# Patient Record
Sex: FEMALE | Race: WHITE | Employment: FULL TIME | URBAN - METROPOLITAN AREA
[De-identification: names, ages, dates, MRNs, and addresses within clinical notes are randomized per-mention and may not be internally consistent; named-entity substitution may affect disease eponyms.]

---
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
## 2019-01-07 ENCOUNTER — OFFICE VISIT (OUTPATIENT)
Dept: FAMILY MEDICINE | Facility: CLINIC | Age: 19
End: 2019-01-07
Payer: COMMERCIAL

## 2019-01-07 VITALS
HEART RATE: 111 BPM | HEIGHT: 69 IN | OXYGEN SATURATION: 97 % | BODY MASS INDEX: 21.92 KG/M2 | RESPIRATION RATE: 20 BRPM | DIASTOLIC BLOOD PRESSURE: 76 MMHG | WEIGHT: 148 LBS | TEMPERATURE: 99 F | SYSTOLIC BLOOD PRESSURE: 130 MMHG

## 2019-01-07 DIAGNOSIS — A63.0 GENITAL WARTS: Primary | ICD-10-CM

## 2019-01-07 PROCEDURE — 46924 DESTRUCTION ANAL LESION(S): CPT | Performed by: NURSE PRACTITIONER

## 2019-01-07 PROCEDURE — 99207 ZZC FOR CODING REVIEW: CPT | Performed by: NURSE PRACTITIONER

## 2019-01-07 ASSESSMENT — MIFFLIN-ST. JEOR: SCORE: 1510.7

## 2019-01-07 NOTE — PROGRESS NOTES
SUBJECTIVE:   Sharona Herrera is a 18 year old female who presents to clinic today for the following health issues:      Vaginal Symptoms      Duration: 5 weeks    Description  itching and red bumps/ rash/blisters on buttocks     Intensity:  mild, moderate    Accompanying signs and symptoms (fever/dysuria/abdominal or back pain): None    History  Sexually active: yes, single partner, contraception - Norplant  Possibility of pregnancy: No  Recent antibiotic use: no     Precipitating or alleviating factors: None    Therapies tried and outcome: none and stopped using tampons and started pads   Outcome: rash got worse    11/6/19: patient was seen in clinic for these symptoms by in clinic-   Referral was placed to GYN for concerns for genital warts.   Patient states that she only gets these bumps when she has her menstrual cycle- possibly due to feminine pads that she is wearing. Unable to wear tampons due to discomfort.  She has an Implanon in place. She is  and does not have concerns for STD's.    No previous history of STD's.       -------------------------------------    Problem list and histories reviewed & adjusted, as indicated.  Additional history: as documented    Patient Active Problem List   Diagnosis     Family history of blood disorder     Mood swings     Seasonal allergic rhinitis     Nexplanon insertion     Past Surgical History:   Procedure Laterality Date     LAPAROSCOPIC APPENDECTOMY CHILD  11/25/2012    Procedure: LAPAROSCOPIC APPENDECTOMY CHILD;;  Surgeon: Pop Anne MD;  Location: [facility]     LAPAROSCOPY DIAGNOSTIC (GENERAL)  11/26/2012    Procedure: LAPAROSCOPY DIAGNOSTIC (GENERAL);  Exploratory Laparoscopy with drainage of abcess;  Surgeon: Pop Anne MD;  Location: [facility] OR     PE TUBES       TONSILLECTOMY & ADENOIDECTOMY       TYMPANOPLASTY CHILD  11/6/2013    Procedure: TYMPANOPLASTY CHILD;  Right Ear Tympanoplasty;  Surgeon: Preston Prince MD;  Location: [facility] OR      "  Social History     Tobacco Use     Smoking status: Passive Smoke Exposure - Never Smoker     Smokeless tobacco: Never Used     Tobacco comment: exposure at mother and fathers house, smoke outside   Substance Use Topics     Alcohol use: No     Alcohol/week: 0.0 oz     Family History   Problem Relation Age of Onset     Depression Mother      Cerebrovascular Disease Mother      Genetic Disorder Mother          Protein S     Heart Disease Maternal Grandfather      Bipolar Disorder Maternal Grandmother      Heart Disease Paternal Grandmother      Cancer Paternal Grandfather         leukemia           Reviewed and updated as needed this visit by clinical staff       Reviewed and updated as needed this visit by Provider         ROS:  Constitutional, HEENT, cardiovascular, pulmonary, GI, , musculoskeletal, neuro, skin, endocrine and psych systems are negative, except as otherwise noted.    OBJECTIVE:     /76 (BP Location: Left arm, Patient Position: Chair)   Pulse 111   Temp 99  F (37.2  C) (Tympanic)   Resp 20   Ht 1.753 m (5' 9\")   Wt 67.1 kg (148 lb)   SpO2 97%   BMI 21.86 kg/m    Body mass index is 21.86 kg/m .  GENERAL APPEARANCE: healthy, alert and no distress   (female): scattered flesh color papules on rectal area    Diagnostic Test Results:  none     ASSESSMENT/PLAN:         1. Genital warts  >  15 warts were treated with Podocon benzoin tincture.   Patient tolerated procedure well  Encouraged to follow up with GYN prn     FABIO Alonso CNP  [facility]  "

## 2019-01-07 NOTE — NURSING NOTE
"Initial /76 (BP Location: Left arm, Patient Position: Chair)   Pulse 111   Temp 99  F (37.2  C) (Tympanic)   Resp 20   Ht 1.753 m (5' 9\")   Wt 67.1 kg (148 lb)   SpO2 97%   BMI 21.86 kg/m   Estimated body mass index is 21.86 kg/m  as calculated from the following:    Height as of this encounter: 1.753 m (5' 9\").    Weight as of this encounter: 67.1 kg (148 lb). .    Anita Lainez    "

## 2019-01-23 PROBLEM — F32.A DEPRESSION, UNSPECIFIED DEPRESSION TYPE: Status: ACTIVE | Noted: 2019-01-23

## 2019-02-14 ENCOUNTER — OFFICE VISIT (OUTPATIENT)
Dept: FAMILY MEDICINE | Facility: CLINIC | Age: 19
End: 2019-02-14
Payer: COMMERCIAL

## 2019-02-14 VITALS
HEIGHT: 69 IN | OXYGEN SATURATION: 97 % | BODY MASS INDEX: 21.17 KG/M2 | RESPIRATION RATE: 18 BRPM | DIASTOLIC BLOOD PRESSURE: 70 MMHG | WEIGHT: 142.9 LBS | TEMPERATURE: 98.3 F | HEART RATE: 95 BPM | SYSTOLIC BLOOD PRESSURE: 110 MMHG

## 2019-02-14 DIAGNOSIS — Z11.1 SCREENING EXAMINATION FOR PULMONARY TUBERCULOSIS: ICD-10-CM

## 2019-02-14 DIAGNOSIS — G43.009 MIGRAINE WITHOUT AURA AND WITHOUT STATUS MIGRAINOSUS, NOT INTRACTABLE: Primary | ICD-10-CM

## 2019-02-14 PROCEDURE — 99214 OFFICE O/P EST MOD 30 MIN: CPT | Performed by: NURSE PRACTITIONER

## 2019-02-14 PROCEDURE — 36415 COLL VENOUS BLD VENIPUNCTURE: CPT | Performed by: NURSE PRACTITIONER

## 2019-02-14 PROCEDURE — 86480 TB TEST CELL IMMUN MEASURE: CPT | Performed by: NURSE PRACTITIONER

## 2019-02-14 RX ORDER — SUMATRIPTAN 25 MG/1
25 TABLET, FILM COATED ORAL
Qty: 8 TABLET | Refills: 1 | Status: SHIPPED | OUTPATIENT
Start: 2019-02-14

## 2019-02-14 RX ORDER — GABAPENTIN 100 MG/1
100 CAPSULE ORAL 2 TIMES DAILY
Qty: 60 CAPSULE | Refills: 2 | Status: SHIPPED | OUTPATIENT
Start: 2019-02-14 | End: 2020-02-26

## 2019-02-14 ASSESSMENT — MIFFLIN-ST. JEOR: SCORE: 1487.57

## 2019-02-14 NOTE — PROGRESS NOTES
SUBJECTIVE:   Sharona Herrera is a 18 year old female who presents to clinic today for the following health issues:    Chief Complaint   Patient presents with     Blood Draw     TB Gold      Headache       Headache  Onset: Long time- Years     Description:   Location: starts in the front of her head then goes all over    Character: throbbing pain  Frequency:  Once a week   Duration:  Sometimes a day, can last up to 3 days     Intensity: 2/10 worse: 10/10     Progression of Symptoms:  Worsening over the past year     Accompanying Signs & Symptoms:  Stiff neck: no   Neck or upper back pain: YES- back pain- sees a chiropractor   Fever: no   Sinus pressure: no   Nausea or vomiting: YES, nausea   Dizziness: YES when she tries to stand up when she has a headache   Numbness: YES- legs tend to go numb   Weakness: YES  Visual changes: no     History:   Head trauma: no   Family history of migraines: YES- Mother   Previous tests for headaches: no   Neurologist evaluations: no   Able to do daily activities: YES- some days, depends on the severity of them   Wake with a headaches: YES  Do headaches wake you up: no   Daily pain medication use: no   Work/school stressors/changes: no     Precipitating factors:   Does light make it worse: YES  Does sound make it worse: YES    Alleviating factors:  Does sleep help: YES-sometimes     Therapies Tried and outcome: ibuprofen 800mg- does help, habiscus tea, essential oils, cold compresses on the back of her neck     Problem list and histories reviewed & adjusted, as indicated.  Additional history: as documented    Labs reviewed in EPIC    Reviewed and updated as needed this visit by clinical staff  Tobacco  Allergies  Meds  Med Hx  Surg Hx  Fam Hx  Soc Hx      Reviewed and updated as needed this visit by Provider         ROS:  Constitutional, HEENT, cardiovascular, pulmonary, gi and gu systems are negative, except as otherwise noted.    OBJECTIVE:     /70 (BP  "Location: Left arm, Patient Position: Sitting, Cuff Size: Adult Regular)   Pulse 95   Temp 98.3  F (36.8  C) (Tympanic)   Resp 18   Ht 1.753 m (5' 9\")   Wt 64.8 kg (142 lb 14.4 oz)   SpO2 97%   BMI 21.10 kg/m    Body mass index is 21.1 kg/m .  GENERAL: healthy, alert and no distress  EYES: Eyes grossly normal to inspection, PERRL and conjunctivae and sclerae normal  NEURO: Normal strength and tone, mentation intact and speech normal  PSYCH: mentation appears normal, affect normal/bright    Diagnostic Test Results:  TB test pending     ASSESSMENT/PLAN:     1. Migraine without aura and without status migrainosus, not intractable  - gabapentin (NEURONTIN) 100 MG capsule; Take 1 capsule (100 mg) by mouth 2 times daily  Dispense: 60 capsule; Refill: 2  - SUMAtriptan (IMITREX) 25 MG tablet; Take 1 tablet (25 mg) by mouth at onset of headache for migraine  Dispense: 8 tablet; Refill: 1  -follow up in 1 month  -avoid daily use of NSAID's to prevent rebound headaches     2. Screening examination for pulmonary tuberculosis    - Quantiferon TB Gold Plus    See Patient Instructions    FABIO Bravo [facility]  "

## 2019-02-14 NOTE — PATIENT INSTRUCTIONS
TB test    Start Gabapentin 100 mg twice daily   Try Imitrex 25-50 mg at the onset of a headache, can repeat treatment in 2 hours if still having headache    Follow up in 1 month to recheck    Can also try Naproxen 220 mg twice daily as needed for headache    Avoid daily use of NSAID's (Ibuproen, Naproxen) as it might cause rebound headaches

## 2019-02-15 ASSESSMENT — ANXIETY QUESTIONNAIRES
GAD7 TOTAL SCORE: 15
4. TROUBLE RELAXING: MORE THAN HALF THE DAYS
2. NOT BEING ABLE TO STOP OR CONTROL WORRYING: NEARLY EVERY DAY
5. BEING SO RESTLESS THAT IT IS HARD TO SIT STILL: MORE THAN HALF THE DAYS
1. FEELING NERVOUS, ANXIOUS, OR ON EDGE: MORE THAN HALF THE DAYS
6. BECOMING EASILY ANNOYED OR IRRITABLE: MORE THAN HALF THE DAYS
IF YOU CHECKED OFF ANY PROBLEMS ON THIS QUESTIONNAIRE, HOW DIFFICULT HAVE THESE PROBLEMS MADE IT FOR YOU TO DO YOUR WORK, TAKE CARE OF THINGS AT HOME, OR GET ALONG WITH OTHER PEOPLE: SOMEWHAT DIFFICULT
7. FEELING AFRAID AS IF SOMETHING AWFUL MIGHT HAPPEN: SEVERAL DAYS
3. WORRYING TOO MUCH ABOUT DIFFERENT THINGS: NEARLY EVERY DAY

## 2019-02-15 ASSESSMENT — PATIENT HEALTH QUESTIONNAIRE - PHQ9: SUM OF ALL RESPONSES TO PHQ QUESTIONS 1-9: 15

## 2019-02-16 ASSESSMENT — ANXIETY QUESTIONNAIRES: GAD7 TOTAL SCORE: 15

## 2019-02-18 PROBLEM — G43.009 MIGRAINE WITHOUT AURA AND WITHOUT STATUS MIGRAINOSUS, NOT INTRACTABLE: Status: ACTIVE | Noted: 2019-02-18

## 2019-02-18 LAB
GAMMA INTERFERON BACKGROUND BLD IA-ACNC: 0.06 IU/ML
M TB IFN-G BLD-IMP: NEGATIVE
M TB IFN-G CD4+ BCKGRND COR BLD-ACNC: >10 IU/ML
MITOGEN IGNF BCKGRD COR BLD-ACNC: 0 IU/ML
MITOGEN IGNF BCKGRD COR BLD-ACNC: 0 IU/ML

## 2019-08-15 ENCOUNTER — TELEPHONE (OUTPATIENT)
Dept: FAMILY MEDICINE | Facility: CLINIC | Age: 19
End: 2019-08-15

## 2019-08-15 ASSESSMENT — PATIENT HEALTH QUESTIONNAIRE - PHQ9
SUM OF ALL RESPONSES TO PHQ QUESTIONS 1-9: 12
5. POOR APPETITE OR OVEREATING: MORE THAN HALF THE DAYS

## 2019-08-15 ASSESSMENT — ANXIETY QUESTIONNAIRES
3. WORRYING TOO MUCH ABOUT DIFFERENT THINGS: NEARLY EVERY DAY
5. BEING SO RESTLESS THAT IT IS HARD TO SIT STILL: MORE THAN HALF THE DAYS
7. FEELING AFRAID AS IF SOMETHING AWFUL MIGHT HAPPEN: MORE THAN HALF THE DAYS
6. BECOMING EASILY ANNOYED OR IRRITABLE: MORE THAN HALF THE DAYS
1. FEELING NERVOUS, ANXIOUS, OR ON EDGE: NEARLY EVERY DAY
GAD7 TOTAL SCORE: 17
2. NOT BEING ABLE TO STOP OR CONTROL WORRYING: NEARLY EVERY DAY

## 2019-08-15 NOTE — TELEPHONE ENCOUNTER
Panel Management Review      Patient has the following on her problem list:     Depression / Dysthymia review    Measure:  Needs PHQ-9 score of 4 or less during index window.  Administer PHQ-9 and if score is 5 or more, send encounter to provider for next steps.    4 - 8 month window range: 6/16/19-10/14/19    PHQ-9 SCORE 12/21/2015 2/15/2019   PHQ-9 Total Score 16 15       If PHQ-9 recheck is 5 or more, route to provider for next steps.    Patient is due for:  PHQ9      Composite cancer screening  Chart review shows that this patient is due/due soon for the following None  Summary:    Patient is due/failing the following:   PHQ9    Action needed:   Patient needs to do PHQ9.    Type of outreach:    Phone, spoke with the patient and questionnaires completed.  Gets more irritable easily and upset faster.  Not currently on any medications - stopped the gabapentin due to side effects - was seeing flashbacks from PTSD, has been trying some behavioral changes which is helping, essential oils.  Is not seeing psychology or psychiatry currently - no health insurance.  Advised the patient I would forward this information to her provider and we will follow up with further recommendations.     PHQ-9 ([employer]) 8/15/2019   1.  Little interest or pleasure in doing things 0   2.  Feeling down, depressed, or hopeless 1   3.  Trouble falling or staying asleep, or sleeping too much 3   4.  Feeling tired or having little energy 3   5.  Poor appetite or overeating 2   6.  Feeling bad about yourself 1   7.  Trouble concentrating 1   8.  Moving slowly or restless 1   9.  Suicidal or self-harm thoughts 0   PHQ-9 Total Score 12   Difficulty at work, home, or with people    ABRAHAN-7   [employer], 2002; Used with Permission) 8/15/2019   1. Feeling nervous, anxious, or on edge 3   2. Not being able to stop or control worrying 3   3. Worrying too much about different things 3   4. Trouble relaxing 2   5. Being so restless that it is hard to sit  still 2   6. Becoming easily annoyed or irritable 2   7. Feeling afraid, as if something awful might happen 2   ABRAHAN-7 Total Score 17   If you checked any problems, how difficult have they made it for you to do your work, take care of things at home, or get along with other people?        Questions for provider review:    Any recommendations?                                                                                                                                      GAYE Laureano MA       Chart routed to Provider.

## 2019-08-15 NOTE — LETTER
August 28, 2019      Sharona Herrera  [address]  [place] [address]        Dear Pau Tabor NPhas been reviewing your chart.  It appears that there are aspects of your care that could be improved.  At this time you are due for : A office visit to follow up on Depression and Anxiety .  If you could plan to do that in the near future it would be very helpful.  We are trying to help our patients achieve their health care goals.  Appointments can be scheduled by calling [phone].     If you have any questions please feel free to contact the clinic     Thank you,      Sincerely,        FABIO Austin CNP

## 2019-08-16 ASSESSMENT — ANXIETY QUESTIONNAIRES: GAD7 TOTAL SCORE: 17

## 2019-08-27 NOTE — TELEPHONE ENCOUNTER
(2nd attempt) Left a voice msg for pt to call back.  Whenever she does schedule depression follow up with PCP.  Malissa Baumann CMA ([facility])   (aka: Karena Baumann)

## 2019-08-28 NOTE — TELEPHONE ENCOUNTER
Panel Management Review      Patient has the following on her problem list:     Depression / Dysthymia review    Measure:  Needs PHQ-9 score of 4 or less during index window.  Administer PHQ-9 and if score is 5 or more, send encounter to provider for next steps.        PHQ-9 SCORE 12/21/2015 2/15/2019 8/15/2019   PHQ-9 Total Score 16 15 12       If PHQ-9 recheck is 5 or more, route to provider for next steps.    Composite cancer screening  Chart review shows that this patient is due/due soon for the following None  Summary:    Patient is due/failing the following:   FOLLOW UP    Action needed:   Patient needs office visit for Depression.    Type of outreach:    Sent letter.      Prince MERRILL CMA

## 2019-11-03 ENCOUNTER — HEALTH MAINTENANCE LETTER (OUTPATIENT)
Age: 19
End: 2019-11-03

## 2020-02-26 ENCOUNTER — OFFICE VISIT (OUTPATIENT)
Dept: FAMILY MEDICINE | Facility: CLINIC | Age: 20
End: 2020-02-26
Payer: COMMERCIAL

## 2020-02-26 VITALS
SYSTOLIC BLOOD PRESSURE: 118 MMHG | DIASTOLIC BLOOD PRESSURE: 62 MMHG | WEIGHT: 145.2 LBS | RESPIRATION RATE: 16 BRPM | BODY MASS INDEX: 22.01 KG/M2 | HEART RATE: 117 BPM | HEIGHT: 68 IN | TEMPERATURE: 98.2 F | OXYGEN SATURATION: 99 %

## 2020-02-26 DIAGNOSIS — Z30.46 NEXPLANON REMOVAL: Primary | ICD-10-CM

## 2020-02-26 PROCEDURE — 99207 ZZC DROP WITH A PROCEDURE: CPT | Performed by: FAMILY MEDICINE

## 2020-02-26 PROCEDURE — 11982 REMOVE DRUG IMPLANT DEVICE: CPT | Performed by: FAMILY MEDICINE

## 2020-02-26 ASSESSMENT — MIFFLIN-ST. JEOR: SCORE: 1471.06

## 2020-02-26 NOTE — PROGRESS NOTES
"Subjective     Sharona Herrera is a 19 year old female who presents to clinic today for the following health issues:    HPI   Chief Complaint   Patient presents with     Contraception     Nexplanon removal. Patient wanting to have babies.   Has been having frequent periods with Nexplanon and ready to start trying for pregnancy.    Review of Systems         Objective    /62   Pulse 117   Temp 98.2  F (36.8  C) (Tympanic)   Resp 16   Ht 1.718 m (5' 7.62\")   Wt 65.9 kg (145 lb 3.2 oz)   LMP  (Exact Date)   SpO2 99%   BMI 22.33 kg/m    Body mass index is 22.33 kg/m .  Physical Exam       Diagnostic Test Results:  Labs reviewed in Epic            Nexplanon Removal:     Is a pregnancy test required: No.  Was a consent obtained?  Yes    Sharona Hererra is here for removal of etonogestrel implant Nexplanon/Implanon    Indication: desiring pregnancy      Preoperative Diagnosis: etonogestrel implant  Postoperative Diagnosis: etonogestrel implant removed    Technique: On the left arm  Skin prep Betadine  Anesthesia 1% lidocaine, with epi  Procedure: Small incision (<5mm) was made at distal end of palpable implant, curved hemostat or mosquito forceps was used to isolate the implant and bring it to the incision, the fibrous capsule containing the implant  was incised and the Implant was removed intact.    EBL: minimal  Complications:  No  Tolerance:  Pt tolerated procedure well and was in stable condition.   Dressing:    A pressure bandage was placed for the next 12-24 hours.    Contraception was discussed and patient chose the following method none      Follow up: Pt was instructed to call if bleeding, severe pain or foul smell.     Rick Craft MD  "

## 2020-02-26 NOTE — PATIENT INSTRUCTIONS
Do start the prenatal vitamin    NEXPLANON removal AFTERCARE INSTRUCTIONS     Keep dressing on and dry for 24 hours, then remove wrap.  Replace bandaid daily for 5 days.      You may have some pain at the site of the Nexplanon removal. You can help relieve the discomfort with Tylenol (acetaminophen), Aspirin or Advil (ibuprofen). If your discomfort worsens or you notice redness spreading on the skin around the removal site, please call the clinic.     Warning Signs   Call the clinic if any of the following occurs:     You have bleeding, pus, or increasing redness, or pain at insertion site.     You have fever or chills     The implant comes out or you have concerns about its location.     You have a positive pregnancy test or suspect you might be pregnant.

## 2020-02-28 ASSESSMENT — ANXIETY QUESTIONNAIRES
GAD7 TOTAL SCORE: 20
6. BECOMING EASILY ANNOYED OR IRRITABLE: NEARLY EVERY DAY
7. FEELING AFRAID AS IF SOMETHING AWFUL MIGHT HAPPEN: NEARLY EVERY DAY
IF YOU CHECKED OFF ANY PROBLEMS ON THIS QUESTIONNAIRE, HOW DIFFICULT HAVE THESE PROBLEMS MADE IT FOR YOU TO DO YOUR WORK, TAKE CARE OF THINGS AT HOME, OR GET ALONG WITH OTHER PEOPLE: VERY DIFFICULT
2. NOT BEING ABLE TO STOP OR CONTROL WORRYING: NEARLY EVERY DAY
3. WORRYING TOO MUCH ABOUT DIFFERENT THINGS: NEARLY EVERY DAY
1. FEELING NERVOUS, ANXIOUS, OR ON EDGE: MORE THAN HALF THE DAYS
5. BEING SO RESTLESS THAT IT IS HARD TO SIT STILL: NEARLY EVERY DAY

## 2020-02-28 ASSESSMENT — PATIENT HEALTH QUESTIONNAIRE - PHQ9
SUM OF ALL RESPONSES TO PHQ QUESTIONS 1-9: 17
5. POOR APPETITE OR OVEREATING: NEARLY EVERY DAY

## 2020-02-29 ASSESSMENT — ANXIETY QUESTIONNAIRES: GAD7 TOTAL SCORE: 20

## 2020-05-27 ENCOUNTER — TELEPHONE (OUTPATIENT)
Dept: FAMILY MEDICINE | Facility: CLINIC | Age: 20
End: 2020-05-27

## 2020-05-27 DIAGNOSIS — Z11.1 SCREENING EXAMINATION FOR PULMONARY TUBERCULOSIS: Primary | ICD-10-CM

## 2020-05-27 NOTE — TELEPHONE ENCOUNTER
Reason for Call:  TB serology test      Detailed comments: patient is calling and stating that she needs a TB test for her schooling. She states she needs the serology test, as the other she gets very woozy and queezy from. Please call, after ordered.    Phone Number Patient can be reached at: Home number on file [phone] (home)    Best Time: any    Can we leave a detailed message on this number? YES   Ivanna Gaytan  Clinic Station        Call taken on 5/27/2020 at 12:32 PM by Ivanna Monterroso

## 2020-06-01 DIAGNOSIS — Z11.1 SCREENING EXAMINATION FOR PULMONARY TUBERCULOSIS: ICD-10-CM

## 2020-06-01 PROCEDURE — 36415 COLL VENOUS BLD VENIPUNCTURE: CPT | Performed by: NURSE PRACTITIONER

## 2020-06-01 PROCEDURE — 86481 TB AG RESPONSE T-CELL SUSP: CPT | Performed by: NURSE PRACTITIONER

## 2020-06-03 LAB
GAMMA INTERFERON BACKGROUND BLD IA-ACNC: 0.05 IU/ML
M TB IFN-G BLD-IMP: NEGATIVE
M TB IFN-G CD4+ BCKGRND COR BLD-ACNC: 8.58 IU/ML
MITOGEN IGNF BCKGRD COR BLD-ACNC: 0 IU/ML
MITOGEN IGNF BCKGRD COR BLD-ACNC: 0 IU/ML

## 2020-11-16 ENCOUNTER — HEALTH MAINTENANCE LETTER (OUTPATIENT)
Age: 20
End: 2020-11-16

## 2021-02-07 ENCOUNTER — HEALTH MAINTENANCE LETTER (OUTPATIENT)
Age: 21
End: 2021-02-07

## 2021-08-05 ENCOUNTER — MEDICAL CORRESPONDENCE (OUTPATIENT)
Dept: HEALTH INFORMATION MANAGEMENT | Facility: CLINIC | Age: 21
End: 2021-08-05

## 2021-08-23 ENCOUNTER — LAB (OUTPATIENT)
Dept: LAB | Facility: CLINIC | Age: 21
End: 2021-08-23
Payer: COMMERCIAL

## 2021-08-23 ENCOUNTER — TELEPHONE (OUTPATIENT)
Dept: FAMILY MEDICINE | Facility: CLINIC | Age: 21
End: 2021-08-23

## 2021-08-23 DIAGNOSIS — Z13.21 ENCOUNTER FOR VITAMIN DEFICIENCY SCREENING: ICD-10-CM

## 2021-08-23 DIAGNOSIS — Z79.899 HIGH RISK MEDICATION USE: Primary | ICD-10-CM

## 2021-08-23 LAB
ALBUMIN SERPL-MCNC: 4.2 G/DL (ref 3.4–5)
ALP SERPL-CCNC: 66 U/L (ref 40–150)
ALT SERPL W P-5'-P-CCNC: 17 U/L (ref 0–50)
AST SERPL W P-5'-P-CCNC: 11 U/L (ref 0–45)
BASOPHILS # BLD AUTO: 0 10E3/UL (ref 0–0.2)
BASOPHILS NFR BLD AUTO: 0 %
BILIRUB DIRECT SERPL-MCNC: 0.2 MG/DL (ref 0–0.2)
BILIRUB SERPL-MCNC: 0.7 MG/DL (ref 0.2–1.3)
EOSINOPHIL # BLD AUTO: 0.1 10E3/UL (ref 0–0.7)
EOSINOPHIL NFR BLD AUTO: 1 %
ERYTHROCYTE [DISTWIDTH] IN BLOOD BY AUTOMATED COUNT: 12.1 % (ref 10–15)
HCT VFR BLD AUTO: 42.9 % (ref 35–47)
HGB BLD-MCNC: 14.7 G/DL (ref 11.7–15.7)
LYMPHOCYTES # BLD AUTO: 2.1 10E3/UL (ref 0.8–5.3)
LYMPHOCYTES NFR BLD AUTO: 33 %
MCH RBC QN AUTO: 31.5 PG (ref 26.5–33)
MCHC RBC AUTO-ENTMCNC: 34.3 G/DL (ref 31.5–36.5)
MCV RBC AUTO: 92 FL (ref 78–100)
MONOCYTES # BLD AUTO: 0.5 10E3/UL (ref 0–1.3)
MONOCYTES NFR BLD AUTO: 8 %
NEUTROPHILS # BLD AUTO: 3.7 10E3/UL (ref 1.6–8.3)
NEUTROPHILS NFR BLD AUTO: 59 %
PLATELET # BLD AUTO: 193 10E3/UL (ref 150–450)
PROT SERPL-MCNC: 8 G/DL (ref 6.8–8.8)
PTH-INTACT SERPL-MCNC: 59 PG/ML (ref 18–80)
RBC # BLD AUTO: 4.67 10E6/UL (ref 3.8–5.2)
TSH SERPL DL<=0.005 MIU/L-ACNC: 1.13 MU/L (ref 0.4–4)
WBC # BLD AUTO: 6.4 10E3/UL (ref 4–11)

## 2021-08-23 PROCEDURE — 84443 ASSAY THYROID STIM HORMONE: CPT

## 2021-08-23 PROCEDURE — 82306 VITAMIN D 25 HYDROXY: CPT

## 2021-08-23 PROCEDURE — 36415 COLL VENOUS BLD VENIPUNCTURE: CPT

## 2021-08-23 PROCEDURE — 85025 COMPLETE CBC W/AUTO DIFF WBC: CPT

## 2021-08-23 PROCEDURE — 83970 ASSAY OF PARATHORMONE: CPT

## 2021-08-23 PROCEDURE — 80076 HEPATIC FUNCTION PANEL: CPT

## 2021-08-23 NOTE — TELEPHONE ENCOUNTER
Patient dropped off orders from [facility] for an EKG    Ordered by Esperanza Landis NP  Diagnosis High risk medication use  V58.69/  Z79.899    Patient does not have a future appointment set up for this.    Fax results to [phone]    Order sent to [facility] for scanning/

## 2021-08-24 LAB — DEPRECATED CALCIDIOL+CALCIFEROL SERPL-MC: 32 UG/L (ref 20–75)

## 2021-09-18 ENCOUNTER — HEALTH MAINTENANCE LETTER (OUTPATIENT)
Age: 21
End: 2021-09-18

## 2022-01-08 ENCOUNTER — HEALTH MAINTENANCE LETTER (OUTPATIENT)
Age: 22
End: 2022-01-08

## 2022-02-16 ENCOUNTER — HOSPITAL ENCOUNTER (EMERGENCY)
Facility: CLINIC | Age: 22
Discharge: HOME | End: 2022-02-17
Attending: EMERGENCY MEDICINE | Admitting: EMERGENCY MEDICINE
Payer: COMMERCIAL

## 2022-02-16 DIAGNOSIS — F10.929 ALCOHOLIC INTOXICATION WITH COMPLICATION (H): ICD-10-CM

## 2022-02-16 DIAGNOSIS — R45.851 SUICIDAL IDEATION: ICD-10-CM

## 2022-02-16 LAB
APAP SERPL-MCNC: <2 MG/L (ref 10–30)
ETHANOL SERPL-MCNC: 0.18 G/DL
HCG SERPL QL: NEGATIVE
SALICYLATES SERPL-MCNC: <2 MG/DL

## 2022-02-16 PROCEDURE — 80179 DRUG ASSAY SALICYLATE: CPT | Performed by: EMERGENCY MEDICINE

## 2022-02-16 PROCEDURE — 93005 ELECTROCARDIOGRAM TRACING: CPT

## 2022-02-16 PROCEDURE — 90791 PSYCH DIAGNOSTIC EVALUATION: CPT

## 2022-02-16 PROCEDURE — 82077 ASSAY SPEC XCP UR&BREATH IA: CPT | Performed by: EMERGENCY MEDICINE

## 2022-02-16 PROCEDURE — 80143 DRUG ASSAY ACETAMINOPHEN: CPT | Performed by: EMERGENCY MEDICINE

## 2022-02-16 PROCEDURE — 99285 EMERGENCY DEPT VISIT HI MDM: CPT | Mod: 25

## 2022-02-16 PROCEDURE — 96365 THER/PROPH/DIAG IV INF INIT: CPT

## 2022-02-16 PROCEDURE — 84703 CHORIONIC GONADOTROPIN ASSAY: CPT | Performed by: EMERGENCY MEDICINE

## 2022-02-16 PROCEDURE — 96368 THER/DIAG CONCURRENT INF: CPT

## 2022-02-16 PROCEDURE — 36415 COLL VENOUS BLD VENIPUNCTURE: CPT | Performed by: EMERGENCY MEDICINE

## 2022-02-16 PROCEDURE — 80048 BASIC METABOLIC PNL TOTAL CA: CPT | Performed by: EMERGENCY MEDICINE

## 2022-02-17 VITALS
SYSTOLIC BLOOD PRESSURE: 114 MMHG | TEMPERATURE: 98.7 F | RESPIRATION RATE: 16 BRPM | OXYGEN SATURATION: 99 % | DIASTOLIC BLOOD PRESSURE: 80 MMHG | HEART RATE: 85 BPM

## 2022-02-17 LAB
ANION GAP SERPL CALCULATED.3IONS-SCNC: 10 MMOL/L (ref 3–14)
ATRIAL RATE - MUSE: 104 BPM
BUN SERPL-MCNC: 8 MG/DL (ref 7–30)
CALCIUM SERPL-MCNC: 8.9 MG/DL (ref 8.5–10.1)
CHLORIDE BLD-SCNC: 107 MMOL/L (ref 94–109)
CO2 SERPL-SCNC: 20 MMOL/L (ref 20–32)
CREAT SERPL-MCNC: 0.67 MG/DL (ref 0.52–1.04)
DIASTOLIC BLOOD PRESSURE - MUSE: NORMAL MMHG
GFR SERPL CREATININE-BSD FRML MDRD: >90 ML/MIN/1.73M2
GLUCOSE BLD-MCNC: 100 MG/DL (ref 70–99)
HOLD SPECIMEN: NORMAL
INTERPRETATION ECG - MUSE: NORMAL
P AXIS - MUSE: 62 DEGREES
POTASSIUM BLD-SCNC: 2.6 MMOL/L (ref 3.4–5.3)
POTASSIUM BLD-SCNC: 4.3 MMOL/L (ref 3.4–5.3)
PR INTERVAL - MUSE: 150 MS
QRS DURATION - MUSE: 88 MS
QT - MUSE: 382 MS
QTC - MUSE: 502 MS
R AXIS - MUSE: 88 DEGREES
SODIUM SERPL-SCNC: 137 MMOL/L (ref 133–144)
SYSTOLIC BLOOD PRESSURE - MUSE: NORMAL MMHG
T AXIS - MUSE: 45 DEGREES
VENTRICULAR RATE- MUSE: 104 BPM

## 2022-02-17 PROCEDURE — 36415 COLL VENOUS BLD VENIPUNCTURE: CPT | Performed by: EMERGENCY MEDICINE

## 2022-02-17 PROCEDURE — 250N000011 HC RX IP 250 OP 636: Performed by: EMERGENCY MEDICINE

## 2022-02-17 PROCEDURE — 250N000013 HC RX MED GY IP 250 OP 250 PS 637: Performed by: EMERGENCY MEDICINE

## 2022-02-17 PROCEDURE — 84132 ASSAY OF SERUM POTASSIUM: CPT | Performed by: EMERGENCY MEDICINE

## 2022-02-17 RX ORDER — ACETAMINOPHEN 325 MG/1
650 TABLET ORAL EVERY 4 HOURS PRN
Status: DISCONTINUED | OUTPATIENT
Start: 2022-02-17 | End: 2022-02-17 | Stop reason: HOSPADM

## 2022-02-17 RX ORDER — IBUPROFEN 600 MG/1
600 TABLET, FILM COATED ORAL EVERY 6 HOURS PRN
Status: DISCONTINUED | OUTPATIENT
Start: 2022-02-17 | End: 2022-02-17 | Stop reason: HOSPADM

## 2022-02-17 RX ORDER — OLANZAPINE 5 MG/1
5 TABLET, ORALLY DISINTEGRATING ORAL 2 TIMES DAILY PRN
Status: DISCONTINUED | OUTPATIENT
Start: 2022-02-17 | End: 2022-02-17 | Stop reason: HOSPADM

## 2022-02-17 RX ORDER — POTASSIUM CHLORIDE 1500 MG/1
40 TABLET, EXTENDED RELEASE ORAL ONCE
Status: COMPLETED | OUTPATIENT
Start: 2022-02-17 | End: 2022-02-17

## 2022-02-17 RX ORDER — POTASSIUM CHLORIDE 7.45 MG/ML
10 INJECTION INTRAVENOUS ONCE
Status: COMPLETED | OUTPATIENT
Start: 2022-02-17 | End: 2022-02-17

## 2022-02-17 RX ORDER — MAGNESIUM SULFATE HEPTAHYDRATE 40 MG/ML
2 INJECTION, SOLUTION INTRAVENOUS ONCE
Status: COMPLETED | OUTPATIENT
Start: 2022-02-17 | End: 2022-02-17

## 2022-02-17 RX ADMIN — POTASSIUM CHLORIDE 40 MEQ: 20 TABLET, EXTENDED RELEASE ORAL at 01:02

## 2022-02-17 RX ADMIN — POTASSIUM CHLORIDE 10 MEQ: 7.46 INJECTION, SOLUTION INTRAVENOUS at 00:16

## 2022-02-17 RX ADMIN — MAGNESIUM SULFATE HEPTAHYDRATE 2 G: 40 INJECTION, SOLUTION INTRAVENOUS at 01:10

## 2022-02-17 NOTE — ED NOTES
DATE:  2/17/2022   TIME OF RECEIPT FROM LAB:  0005  LAB TEST:  potassium  LAB VALUE:  2.6  RESULTS GIVEN WITH READ-BACK TO (PROVIDER):  MD Paramjit  TIME LAB VALUE REPORTED TO PROVIDER:   0005

## 2022-02-17 NOTE — ED PROVIDER NOTES
"  History   Chief Complaint:  Alcohol Intoxication and Suicidal       HPI   Sharona Herrera is a 21 year old female with history of ADHD and depression who presents with alcohol intoxication and suicidal ideation. Per EMS, the patient was intoxicated at home tonight and had suicidal ideation with a plan to take all of her prescribed medications or to \"drown herself in omayra.\" She was combative and uncooperative when EMS and police arrived. She was given 5 mg versed and 5mg haldol en route. Here in the ED, she is sleeping in the hospital bed.    Able to provide further collateral later during her ED stay.  States she has not eaten much of anything since Sunday and has been having stress and disagreements with her  at home.  She denies any form of abuse.  Denies vomiting or diarrhea.  States he drank the alcohol just to escape.  Denies any other intoxicating substances.      Review of Systems   Unable to perform ROS: Alcohol intoxication    Allergies:  Pollen Extract  Trees  Vicodin    Medications:  The patient is currently on no regular medications.    Past Medical History:     Appendicitis  ADHD  Head injury  Perforated eardrum  Mood swings  Depression  Migraines    Past Surgical History:    Laparoscopic appendectomy  PE tubes  T&A  Tympanoplasty child    Family History:    Mother: depression, cerebrovascular disease, protein S genetic disorder    Social History:  Presents alone    Physical Exam     Patient Vitals for the past 24 hrs:   BP Temp Temp src Pulse Resp SpO2   02/17/22 0200 112/68 -- -- 112 -- 98 %   02/17/22 0145 -- -- -- 112 -- 100 %   02/17/22 0130 116/77 -- -- 87 -- 99 %   02/17/22 0115 107/62 -- -- 107 -- 100 %   02/17/22 0100 134/75 -- -- 117 -- 100 %   02/17/22 0045 117/63 -- -- (!) 130 -- 100 %   02/17/22 0030 107/63 -- -- 97 -- 98 %   02/17/22 0000 92/45 -- -- 83 -- 99 %   02/16/22 2330 90/46 -- -- 80 -- 99 %   02/16/22 2320 98/50 -- -- 81 -- 94 %   02/16/22 2315 (!) 85/47 -- -- " 79 -- 97 %   22 -- -- -- 89 -- 98 %   22 124/65 -- -- 84 -- 98 %   22 10752 -- -- 80 -- 96 %   22 107 98.7  F (37.1  C) Oral 81 18 99 %       Physical Exam    HENT:  Atraumatic mmm, no rhinorrhea  Eyes: periorbital tissues and sclera normal, pupils 4mm bialt   Neck: supple, no abnormal swelling  Lungs:  CTAB,  no resp distress  CV: rrr, no m/r/g, ppi  Abd: soft, nontender, nondistended,  Ext: no peripheral edema, no bony ttp  Skin: warm, dry, well perfused, no rashes/bruising/lesions on exposed skin  Neuro: sleeping, arouse with noxious stimuli, localizes in all 4 ext, no clonus   Psych: Normal mood, normal affect      Emergency Department Course   ECG  ECG obtained at 2306, ECG read at 2310  Sinus tachycardia  Otherwise normal ECG  Rate 104 bpm. MI interval 150 ms. QRS duration 88 ms. QT/QTc 382/502 ms. P-R-T axes 62 88 45.     Laboratory:  Labs Ordered and Resulted from Time of ED Arrival to Time of ED Departure   ETHYL ALCOHOL LEVEL - Abnormal       Result Value    Alcohol ethyl 0.18 (*)    BASIC METABOLIC PANEL - Abnormal    Sodium 137      Potassium 2.6 (*)     Chloride 107      Carbon Dioxide (CO2) 20      Anion Gap 10      Urea Nitrogen 8      Creatinine 0.67      Calcium 8.9      Glucose 100 (*)     GFR Estimate >90     ACETAMINOPHEN LEVEL - Abnormal    Acetaminophen <2 (*)    SALICYLATE LEVEL - Normal    Salicylate <2     HCG QUALITATIVE PREGNANCY - Normal    hCG Serum Qualitative Negative     POTASSIUM - Normal    Potassium 4.3          Emergency Department Course:     Reviewed:  I reviewed nursing notes, vitals and past medical history    Assessments:   I obtained history and examined the patient as noted above.    50 I rechecked the patient and explained findings.    Consults:  DEC    Interventions:  0016 Potassium chloride 10 mEq IV    Disposition:  Pending    Impression & Plan     Medical Decision Makin-year-old female here intoxicated with  reported suicidal ideation.  Will be placed on BARB hold until sober and mental health assessment can be done.  ECG showing mildly prolonged QTC likely secondary to hypokalemia.  Given she is currently quite sleepy from Versed/Haldol by EMS we will do IV replacement along with magnesium.  Tox work-up otherwise unremarkable.      Repeat potassium improved.  Delay until mental health assessment until approximately 8 AM.  Disposition pending that evaluation.      Diagnosis:    ICD-10-CM    1. Alcoholic intoxication with complication (H)  F10.929    2. Suicidal ideation  R45.851        Scribe Disclosure:  I, Juan C Galvan, am serving as a scribe at 10:58 PM on 2/16/2022 to document services personally performed by Wilbert Yusuf MD based on my observations and the provider's statements to me.         Wilbert Yusuf MD  02/17/22 0521

## 2022-02-17 NOTE — ED PROVIDER NOTES
Patient signed out to me by Dr. Yusuf.  Patient presented to the ED with alcohol intoxication, had some threats of suicide and some aggression.  She was placed on a healthcare officer hold and held in the ED until we are able to get a more sober assessment.  No issues on my shift.  This morning she was seen by the DEC team and they felt she was safe for outpatient management.  This seems very reasonable I am in agreement she is at low risk for suicide in the near future.  She can be discharged home with a family member.    MD Alexy Ty Paul Anthony, MD  02/17/22 0857       Preston Tracey MD  02/17/22 0899

## 2022-02-17 NOTE — PROVIDER NOTIFICATION
Pharmacy was consulted to perform a medication history on this patient boarding in the [facility] ED. RN/Provider was notified that we are unable to perform this consult until after 0730 on 2/17/2022. Disposition: RN/Provider will review any PTA medications with patient that may be due overnight.  ABC, RPh

## 2022-02-17 NOTE — ED TRIAGE NOTES
Arrives via EMS for alcohol intoxication and suicidal ideation with a plan. Comes in on an BARB via EMS. EMS reports pt states her suicidal plan is to take all of her prescribed medications or to drown herself in omayra.  at scene. Pt combative and uncooperative when EMS and police arrives, stating she does not want the  there and that she will kill them all. Pt is given 5mg versed and 5mg haldol IM. Pt currently sleeping in hospital bed. Pt is searched, changed into gown. Pt placed on 2 liters nasal cannula. VSS. ABC's intact.

## 2022-02-17 NOTE — DISCHARGE INSTRUCTIONS
DEC Aftercare Plan  If I am feeling unsafe or I am in a crisis, I will:   Contact my established care providers   Call the National Suicide Prevention Lifeline: 316.108.9791   Go to the nearest emergency room    Call 911     Warning signs that I or other people might notice when a crisis is developing for me:   Thoughts of suicide or physical self harm  difficulty managing day to day tasks  Sudden increase in depression     Things I am able to do on my own to cope or help me feel better:   Ask for help as needed  Utilize grounding techniques or calming strategies    Things that I am able to do with others to cope or help me feel better:   Share thoughts and feelings  Ask for ideas or solutions to worries/concerns     People in my life that I can ask for help:   Spouse  Therapist  Close friend     Your [facility] has a mental health crisis team you can call 24/7: [facility]  [phone]    Additional resources and information:   1- P can be contacted at #[phone] for assistance with scheduling outpatient services as discussed; individual therapy/transition clinic services    2- coping skills for anxiety  Stop and Breathe     When anxiety flares, take a time out and think about what it is that is making you so nervous. Anxiety is typically experienced as worrying about a future or past event.     For example, you may be worried that something bad is going to happen in the future. Perhaps you continually feel upset over an event that has already occurred. Regardless of what you are worried about, a big part of the problem is that you are not being mindful of the present moment.     Anxiety loses its  when you clear your mind of worry and bring your awareness back to the present.     The next time your anxiety starts to take you out of the present, regain control by sitting down and taking a few deep breaths. Simply stopping and breathing can help restore a sense of personal balance and bring you back  to the present moment. However, if you have the time, try taking this activity a little further and experiment with a breathing exercise and mantra.          Figure Out What's Bothering You     The physical symptoms of panic and anxiety, such as trembling, chest pain, and rapid heartbeat, are usually more apparent than understanding just what is making you anxious. However, in order to get to the root of your anxiety, you need to figure out what s bothering you. To get to the bottom of your anxiety, put some time aside to exploring your thoughts and feelings.     Writing in a journal can be a great way to get in touch with your sources of anxiety. If anxious feelings seem to be keeping you up at night, try keeping a journal or notepad next to your bed. Write down all of the things that are bothering you. Talking with a friend can be another way to discover and understand your anxious feelings.1?     Make it a habit to regularly uncover and express your feelings of anxiety.          Focus On What You Can Change     Many times anxiety stems from fearing things that haven t even happened and may never occur. For example, even though everything is okay, you may still worry about potential issues, such as losing your job, becoming ill, or the safety of your loved ones.     Life can be unpredictable and no matter how hard you try, you can t always control what happens. However, you can decide how you are going to deal with the unknown. You can turn your anxiety into a source of strength by letting go of fear and focusing on gratitude.          Replace your fears by changing your attitude about them. For example, stop fearing to lose your job and instead focus on how grateful you are to have a job. Come to work determined to do your best. Instead of fearing your loved one's safety, spend time with them, or express your appreciation of them. With a little practice, you can learn to dump your anxiety and  a more  positive outlook.     At times, your anxiety may actually be caused by a real circumstance in your life. Perhaps you re in a situation where it is realistic to be worried about losing your job due to high company layoffs or talks of downsizing.     When anxiety is identified as being caused by a current problem, then taking action may be the answer to reducing your anxiety.4? For example, you may need to start job searching or scheduling interviews after work.     By being more proactive, you can feel like you have a bit more control over your situation.          Focus on Something Less Anxiety-Provoking     At times, it may be most helpful to simply redirect yourself to focus on something other than your anxiety.6? You may want to reach out to others, do some work around your home, or engage in an enjoyable activity or hobby. Here are a few ideas of things you can do to thwart off anxiety:     Do some chores or organizing around the house.     Engage in a creative activity, such as drawing, painting, or writing.     Go for a ?walk or engage in some other form of physical exercise.     Listen to music.     Severn or meditate.     Read a good book or watch a funny movie.     Most people are familiar with experiencing some anxiety from time-to-time. However, chronic anxiety can be a sign of a diagnosable anxiety disorder.     When anxiety affects one s relationships, work performance, and other areas of life, there is potential that these anxious feelings are actually an indication of mental health illness.     If you are experiencing anxiety and panic symptoms, talk with your doctor or other professionals who treat panic disorder. They will be able to address any concerns you have, provide information on diagnosis, and discuss your treatment options.           Crisis Lines  Crisis Text Line  Text 531198  You will be connected with a trained live crisis counselor to provide support.    norm Pizarro a  "934009 o texto a 442-AYUDAME en WhatsApp    National Hope Line  1.800.SUICIDE [1741474]      Community Resources  Fast Tracker  Linking people to mental health and substance use disorder resources  InteKrin.MyCosmik     [place] Mental [facility] Warm Line  Peer to peer support  Monday thru Saturday, 12 pm to 10 pm  137.944.7964 or 3.912.115.8626  Text \"Support\" to 76157    National [employer] (GUSTAVO)  989.113.6711 or 1.888.GUSTAVO.HELPS        Mental Health Apps  My3  https://my3app.org/    VirtualHopeBox  https://[employer].org/apps/virtual-hope-box/    "

## 2022-02-17 NOTE — ED NOTES
"2/16/2022  Sharona Herrera 2000     [facility] Crisis Assessment    Patient was assessed: remote  Patient location: [facility] ED    Referral Data and Chief Complaint  Jennifer is a 21 year old who uses she/her pronouns. Patient presented to the ED via EMS and was referred to the ED by family/friends. Patient is presenting to the ED for the following concerns: alcohol intoxication and suicidal thoughts.      Informed Consent and Assessment Methods    Patient is her own guardian. Writer met with patient and explained the crisis assessment process, including applicable information disclosures and limits to confidentiality, assessed understanding of the process, and obtained consent to proceed with the assessment. Patient was observed to be able to participate in the assessment as evidenced by pt was alert and oriented during assessment. Assessment methods included conducting a formal interview with patient, review of medical records, collaboration with medical staff, and obtaining relevant collateral information from family and community providers when available.    Narrative Summary of Presenting Problem and Current Functioning  What led to the patient presenting for crisis services, factors that make the crisis life threatening or complex, stressors, how is this disrupting the patient's life, and how current functioning is in comparison to baseline. How is patient presenting during the assessment.     Pt reports interpersonal stressors with her mother and  leading up to excessive alcohol use yesterday. She reports when intoxicated this increased her depression and suicidal thoughts. Pt was brought to the hospital for intoxication concerns and was reporting suicidal thoughts with plans to drink her self to death or overdose. Pt was calm and cooperative during assessment. Pt reported \"feeling better\" since last night and denies active suicidal thoughts, plans or intent. Pts spouse was in the room with pt " during assessment.     History of the Crisis  Duration of the current crisis, coping skills attempted to reduce the crisis, community resources used, and past presentations.    Pt reports she has been working on her anxiety, depression and PTSD in therapy since August of 2021. She reports history of DBT at age [age]. Pt reports she has not seen her therapist in several months. Reports she has psychiatrist and saw that provider earlier this month. Pt reports her spouse and close friend are supportive of her.     Collateral Information  Medical records/EPIC    Risk Assessment    Risk of Harm to Self     ESS-6  1.a. Over the past 2 weeks, have you had thoughts of killing yourself? Yes  1.b. Have you ever attempted to kill yourself and, if yes, when did this last happen? No   2. Recent or current suicide plan? Yes thoughts while intoxicated last night; denies those thoughts currently   3. Recent or current intent to act on ideation? No  4. Lifetime psychiatric hospitalization? No  5. Pattern of excessive substance use? No  6. Current irritability, agitation, or aggression? No  Scoring note: BOTH 1a and 1b must be yes for it to score 1 point, if both are not yes it is zero. All others are 1 point per number. If all questions 1a/1b - 6 are no, risk is negligible. If one of 1a/1b is yes, then risk is mild. If either question 2 or 3, but not both, is yes, then risk is automatically moderate regardless of total score. If both 2 and 3 are yes, risk is automatically high regardless of total score.     Score: 2, mild risk    The patient has the following risk factors for suicide: active  or , depressive symptoms, poor impulse control and restless/agitated    Is the patient experiencing current suicidal ideation: No    Is the patient engaging in preparatory suicide behaviors (formulating how to act on plan, giving away possessions, saying goodbye, displaying dramatic behavior changes, etc)? No    Does the patient  have access to firearms or other lethal means? no    The patient has the following protective factors: social support, established relationship community mental health provider(s), future focused thinking, displays insight, expresses desire to engage in treatment, sense of obligation to people/pets and safe/stable housing    Support system information: Pt reports her spouse and close friend. She reports her outpatient providers are also supportive of her    Patient strengths: willing to share thoughts and feelings, willing to engage in outpatient services     Does the patient engage in non-suicidal self-injurious behavior (NSSI/SIB)? no    Is the patient vulnerable to sexual exploitation?  No    Is the patient experiencing abuse or neglect? no    Is the patient a vulnerable adult? No      Risk of Harm to Others  The patient has the following risk factors of harm to others: no risk factors identified    Does the patient have thoughts of harming others? No    Is the patient engaging in sexually inappropriate behavior?  no       Current Substance Abuse    Is there recent substance abuse? Pt reports yesterday drinking in excess; denies daily use of alcohol otherwise     Was a urine drug screen or blood alcohol level obtained: Yes 0.18 on arrival in ED    CAGE AID  Have you felt you ought to cut down on your drinking or drug use?  No  Have people annoyed you by criticizing your drinking or drug use? No  Have you felt bad or guilty about your drinking or drug use? No  Have you ever had a drink or used drugs first thing in the morning to steady your nerves or to get rid of a hangover? No  Score: 0/4       Current Symptoms/Concerns    Symptoms  Attention, hyperactivity, and impulsivity symptoms present: No    Anxiety symptoms present: Yes: Generalized Symptoms: Cognitive anxiety - feelings of doom, racing thoughts, difficulty concentrating  and Excessive worry      Appetite symptoms present: Yes: Loss of Appetite and Other  Eating Problems     Behavioral difficulties present: No     Cognitive impairment symptoms present: No    Depressive symptoms present: Yes Appetite change/weight change , Crying or feels like crying, Depressed mood, Impaired concentration, Impaired decision making , Increased irritability/agitation, Sleep disturbance  and Thoughts of suicide/death      Eating disorder symptoms present: Yes: Binging and Restricting    Learning disabilities, cognitive challenges, and/or developmental disorder symptoms present: No     Manic/hypomanic symptoms present: No    Personality and interpersonal functioning difficulties present : Yes: Cognitive Distortions, Emotional Deregulation, Impaired Impulse Control and Impaired Interpersonal Functioning    Psychosis symptoms present: No      Sleep difficulties present: Yes: Difficulty falling asleep  and Difficulty staying sleep     Substance abuse disorder symptoms present: No     Trauma and stressor related symptoms present: No           Mental Status Exam   Affect: Appropriate   Appearance: Appropriate    Attention Span/Concentration: Attentive?    Eye Contact: Engaged   Fund of Knowledge: Appropriate    Language /Speech Content: Fluent   Language /Speech Volume: Normal    Language /Speech Rate/Productions: Normal    Recent Memory: Intact   Remote Memory: Intact   Mood: Normal    Orientation to Person: Yes    Orientation to Place: Yes   Orientation to Time of Day: Yes    Orientation to Date: Yes    Situation (Do they understand why they are here?): Yes    Psychomotor Behavior: Normal    Thought Content: Clear   Thought Form: Goal Directed and Intact       Mental Health and Substance Abuse History    History  Current and historical diagnoses or mental health concerns: history of depression, anxiety, PTSD and BPD.    Prior MH services (inpatient, programmatic care, outpatient, etc) : Yes history of outpatient [facility]ist services including DBT    Has the patient used county crisis team  services before?: No    History of substance abuse: No    Prior LOBITO services (inpatient, programmatic care, detox, outpatient, etc) : No    History of commitment: No    Family history of MH/LOBITO: No    Trauma history: Yes pt reports grief and loss related to death of brothers and grandfather; did not diclose further details on these losses     Medication  Psychotropic medications: Yes. Pt is currently taking anxiety, depression and sleep medications per pt report. Medication compliant: Yes. Recent medication changes: No    Current Care Team  Primary Care Provider: No    Psychiatrist: Yes. Name: unknown. Location: unknown. Date of last visit: within the last month. Frequency: as needed. Perceived helpfulness: helfpul.    Therapist: No    : No    CTSS or ARMHS: No    ACT Team: No    Other: No    Release of Information  Was a release of information signed: No. 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 No     Patient can ambulate independently? Yes     Other medical concerns? No     History of concussion or TBI? No        Diagnosis    296.32 (F33.1) Major Depressive Disorder, Recurrent Episode, Moderate _ and With anxious distress - by history     300.02 (F41.1) Generalized Anxiety Disorder - by history     309.81 (F43.10) Posttraumatic Stress Disorder (includes Posttraumatic Stress Disorder for Children 6 Years and Younger)  With dissociative symptoms - by history       Therapeutic Intervention  The following therapeutic  methodologies were employed when working with the patient: establishing rapport, active listening, identifying additional supports and alternative coping skills, establishing a discharge plan, brief supportive therapy and treatment planning. Patient response to intervention: pt as cooperative and engaged in assessment process.      Disposition  Recommended disposition: Individual Therapy and Medication Management      Reviewed case and recommendations with attending provider. Attending Name: Dr. Preston Tracey      Attending concurs with disposition: Yes      Patient concurs with disposition: Yes      Guardian concurs with disposition: NA     Final disposition: Individual therapy  and Medication management.           Clinical Substantiation of Recommendations   Rationale with supporting factors for disposition and diagnosis.     Pt is 21 year old female with history of depression, anxiety, PTSD and BPD. Pt was alert and oriented during assessment. Pt denied SIB, HI and hallucinations. Pt reported symptoms of depression, anxiety, grief and loss and episodes of suicidal thoughts. Pt denied active suicidal thoughts or intent, reports alcohol use exacerbated these thoughts prior to arrival in ED. 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 Pt reports outpatient psychiatrist and takes her medications as prescribed, no recent changes. Pt reports working with a therapist a few months ago weekly but has not seen that provider recently, reports desire to start services with this previous provider again for therapy. Pt denies daily use of alcohol or other drugs. Pt recommended for outpatient LOC and discharge.       Assessment Details  Patient interview started at: 8:28 AM and completed at: 8:49 AM.    Total duration spent on the patient case in minutes: 1.50 hrs     CPT code(s) utilized: 60140 - Psychotherapy for Crisis - 60 (30-74*) min       Aftercare and Safety Planning  Follow up  plans with MH/LOBITO services: Yes pt reports she will follow up with established providers       Aftercare plan placed in the AVS and provided to patient: Yes. Given to patient by ED staff    [facility]      Aftercare Plan  If I am feeling unsafe or I am in a crisis, I will:   Contact my established care providers   Call the National Suicide Prevention Lifeline: 981.116.3065   Go to the nearest emergency room    Call 911     Warning signs that I or other people might notice when a crisis is developing for me:   Thoughts of suicide or physical self harm  difficulty managing day to day tasks  Sudden increase in depression     Things I am able to do on my own to cope or help me feel better:   Ask for help as needed  Utilize grounding techniques or calming strategies    Things that I am able to do with others to cope or help me feel better:   Share thoughts and feelings  Ask for ideas or solutions to worries/concerns     People in my life that I can ask for help:   Spouse  Therapist  Close friend     Your [facility] has a mental health crisis team you can call 24/7: [facility]  [phone]    Additional resources and information:   1- [facility] can be contacted at #[phone] for assistance with scheduling outpatient services as discussed; individual therapy/transition clinic services    2- coping skills for anxiety  Stop and Breathe     When anxiety flares, take a time out and think about what it is that is making you so nervous. Anxiety is typically experienced as worrying about a future or past event.     For example, you may be worried that something bad is going to happen in the future. Perhaps you continually feel upset over an event that has already occurred. Regardless of what you are worried about, a big part of the problem is that you are not being mindful of the present moment.     Anxiety loses its  when you clear your mind of worry and bring your awareness back to the present.     The next  time your anxiety starts to take you out of the present, regain control by sitting down and taking a few deep breaths. Simply stopping and breathing can help restore a sense of personal balance and bring you back to the present moment. However, if you have the time, try taking this activity a little further and experiment with a breathing exercise and mantra.          Figure Out What's Bothering You     The physical symptoms of panic and anxiety, such as trembling, chest pain, and rapid heartbeat, are usually more apparent than understanding just what is making you anxious. However, in order to get to the root of your anxiety, you need to figure out what s bothering you. To get to the bottom of your anxiety, put some time aside to exploring your thoughts and feelings.     Writing in a journal can be a great way to get in touch with your sources of anxiety. If anxious feelings seem to be keeping you up at night, try keeping a journal or notepad next to your bed. Write down all of the things that are bothering you. Talking with a friend can be another way to discover and understand your anxious feelings.1?     Make it a habit to regularly uncover and express your feelings of anxiety.          Focus On What You Can Change     Many times anxiety stems from fearing things that haven t even happened and may never occur. For example, even though everything is okay, you may still worry about potential issues, such as losing your job, becoming ill, or the safety of your loved ones.     Life can be unpredictable and no matter how hard you try, you can t always control what happens. However, you can decide how you are going to deal with the unknown. You can turn your anxiety into a source of strength by letting go of fear and focusing on gratitude.          Replace your fears by changing your attitude about them. For example, stop fearing to lose your job and instead focus on how grateful you are to have a job. Come to work  determined to do your best. Instead of fearing your loved one's safety, spend time with them, or express your appreciation of them. With a little practice, you can learn to dump your anxiety and  a more positive outlook.     At times, your anxiety may actually be caused by a real circumstance in your life. Perhaps you re in a situation where it is realistic to be worried about losing your job due to high company layoffs or talks of downsizing.     When anxiety is identified as being caused by a current problem, then taking action may be the answer to reducing your anxiety.4? For example, you may need to start job searching or scheduling interviews after work.     By being more proactive, you can feel like you have a bit more control over your situation.          Focus on Something Less Anxiety-Provoking     At times, it may be most helpful to simply redirect yourself to focus on something other than your anxiety.6? You may want to reach out to others, do some work around your home, or engage in an enjoyable activity or hobby. Here are a few ideas of things you can do to thwart off anxiety:     Do some chores or organizing around the house.     Engage in a creative activity, such as drawing, painting, or writing.     Go for a ?walk or engage in some other form of physical exercise.     Listen to music.     [place] or meditate.     Read a good book or watch a funny movie.     Most people are familiar with experiencing some anxiety from time-to-time. However, chronic anxiety can be a sign of a diagnosable anxiety disorder.     When anxiety affects one s relationships, work performance, and other areas of life, there is potential that these anxious feelings are actually an indication of mental health illness.     If you are experiencing anxiety and panic symptoms, talk with your doctor or other professionals who treat panic disorder. They will be able to address any concerns you have, provide information on  "diagnosis, and discuss your treatment options.           Crisis Lines  Crisis Text Line  Text 313405  You will be connected with a trained live crisis counselor to provide support.    Por espanol, texto  SHER a 726200 o texto a 442-AYUDAME en WhatsApp    National Hope Line  1.800.SUICIDE [8622452]      Community Resources  Fast Tracker  Linking people to mental health and substance use disorder resources  [employer]     [place] Mental [facility] Warm Line  Peer to peer support  Monday thru Saturday, 12 pm to 10 pm  720.844.8040 or 7.826.267.2635  Text \"Support\" to 08077    National [place] [employer] Mental [employer]ness (GUSTAVO)  050.648.5720 or 1.888.GUSTAVO.HELPS        Mental Health Apps  My3  https://my3app.org/    VirtualHopeBox  https://[employer].org/apps/virtual-hope-box/            "

## 2022-02-17 NOTE — ED NOTES
Patient up to commode x 2, Patients behavior  appropriate,  visiting. Remains on hold , observation/safety watch 1:1.

## 2022-11-19 ENCOUNTER — HEALTH MAINTENANCE LETTER (OUTPATIENT)
Age: 22
End: 2022-11-19

## 2023-01-17 NOTE — ED NOTES
Problem: Respiratory Impairment - Respiratory Therapy 253  Intervention: Respiratory support devices  Note: Intervention Status  Done    Home unit       Patient slipped and fell with leg hitting metal bar on right shin on Thursday.  Pain progressing down to foot and has 8/10 pain when walking.

## 2023-04-09 ENCOUNTER — HEALTH MAINTENANCE LETTER (OUTPATIENT)
Age: 23
End: 2023-04-09

## 2024-06-16 ENCOUNTER — HEALTH MAINTENANCE LETTER (OUTPATIENT)
Age: 24
End: 2024-06-16

## 2025-04-01 NOTE — ED AVS SNAPSHOT
[facility] Emergency Department    [address] [place] [address]    Phone:  [phone]    Fax:  [phone]                                       Sharona Juan   MRN: [id number]    Department:  [facility] Emergency Department   Date of Visit:  5/31/2018           After Visit Summary Signature Page     I have received my discharge instructions, and my questions have been answered. I have discussed any challenges I see with this plan with the nurse or doctor.    ..........................................................................................................................................  Patient/Patient Representative Signature      ..........................................................................................................................................  Patient Representative Print Name and Relationship to Patient    ..................................................               ................................................  Date                                            Time    ..........................................................................................................................................  Reviewed by Signature/Title    ...................................................              ..............................................  Date                                                            Time           Notified Carmela that patient can d/c o2. She said that she will handle it with the supplier.
